# Patient Record
Sex: MALE | URBAN - METROPOLITAN AREA
[De-identification: names, ages, dates, MRNs, and addresses within clinical notes are randomized per-mention and may not be internally consistent; named-entity substitution may affect disease eponyms.]

---

## 2024-10-14 ENCOUNTER — APPOINTMENT (OUTPATIENT)
Dept: URBAN - METROPOLITAN AREA SURGERY 20 | Age: 69
Setting detail: DERMATOLOGY
End: 2024-10-15

## 2024-10-14 PROBLEM — C44.712 BASAL CELL CARCINOMA OF SKIN OF RIGHT LOWER LIMB, INCLUDING HIP: Status: ACTIVE | Noted: 2024-10-14

## 2024-10-14 PROCEDURE — OTHER ADDITIONAL NOTES: OTHER

## 2024-10-14 PROCEDURE — OTHER MIPS QUALITY: OTHER

## 2024-10-14 PROCEDURE — 99214 OFFICE O/P EST MOD 30 MIN: CPT

## 2024-10-14 PROCEDURE — OTHER CONSULTATION FOR SURGERY: OTHER

## 2024-10-14 NOTE — HPI: MOHS SURGERY CONSULTATION
Body Location Override (Optional): Right calf
Who Is Your Referring Provider?: Dr Beltre at [facility]

## 2024-10-14 NOTE — PROCEDURE: CONSULTATION FOR SURGERY
Incorporate Mauc In Note: Yes
Detail Level: Detailed
X Size Of Lesion In Cm (Optional): 0
Aggressive Histology Indication Override: nodular ulcerated
Anatomic Location From Referring Provider: right calf
Other Plan: To be scheduled at a future date

## 2024-12-13 ENCOUNTER — APPOINTMENT (OUTPATIENT)
Dept: URBAN - METROPOLITAN AREA SURGERY 20 | Age: 69
Setting detail: DERMATOLOGY
End: 2024-12-13

## 2024-12-13 VITALS — SYSTOLIC BLOOD PRESSURE: 150 MMHG | TEMPERATURE: 97.6 F | DIASTOLIC BLOOD PRESSURE: 94 MMHG | HEART RATE: 72 BPM

## 2024-12-13 DIAGNOSIS — I87.2 VENOUS INSUFFICIENCY (CHRONIC) (PERIPHERAL): ICD-10-CM

## 2024-12-13 PROBLEM — C44.712 BASAL CELL CARCINOMA OF SKIN OF RIGHT LOWER LIMB, INCLUDING HIP: Status: ACTIVE | Noted: 2024-12-13

## 2024-12-13 PROCEDURE — 29580 STRAPPING UNNA BOOT: CPT | Mod: RT

## 2024-12-13 PROCEDURE — 17313 MOHS 1 STAGE T/A/L: CPT

## 2024-12-13 PROCEDURE — 12032 INTMD RPR S/A/T/EXT 2.6-7.5: CPT | Mod: 59

## 2024-12-13 PROCEDURE — OTHER MIPS QUALITY: OTHER

## 2024-12-13 PROCEDURE — OTHER MOHS SURGERY: OTHER

## 2024-12-13 PROCEDURE — OTHER UNNA BOOT APPLICATION: OTHER

## 2024-12-13 NOTE — PROCEDURE: MOHS SURGERY
Inflammation Suggestive Of Cancer Camouflage Histology Text: There was a dense lymphocytic infiltrate which prevented adequate histologic evaluation of adjacent structures and camouflaged potential tumor present.
Quadrant Reporting?: no
Bcc Macronodular Histology Text: There were large aggregates of basaloid cells.
Scc Well Differentiated Histology Text: There were aggregates of well-differentiated squamous cells.
Mid-Level Procedure Text (C): After obtaining clear surgical margins the patient was sent to a mid-level provider for surgical repair.  The patient understands they will receive post-surgical care and follow-up from the mid-level provider.
Cartilage Graft Text: A scalpel aluminum foil packet is folded, molded and trimmed to serve as an exact template for the ear cartilage strut required for nasal support prior to flap or graft coverage.  The most precisely matching concave surface is chosen, outlined, prepped and draped in the standard sterile fashion. A semicircular incision is made in the ear skin and a flap elevated to expose the underlying cartilage, which is outlined with the template, incised and removed atraumatically and placed in sterile saline.   Hemostasis with electrocoagulation.  The flap is replaced and repaired with simple interrupted, running and basting sutures with sutures.  A pressure dressing is applied.  The cartilage graft is trimmed to precisely fit the defect, with 2-3 mm of additional length on each end.  The borders of the defect are incised to create pockets into which the cartilage graft is inserted to stabilize it.  The graft is secured to the base of the defect with sutures.
No Repair - Repaired With Adjacent Surgical Defect Text (Leave Blank If You Do Not Want): After obtaining clear surgical margins the defect was repaired concurrently with the other surgical defect which was in close approximation.
Vermilion Border Text: The closure involved the vermilion border.
Use Separate Skin Prep For Stages And Repair: Yes
Tarsorrhaphy Text: A tarsorrhaphy was performed using Frost sutures.
Second Skin Substitute Units (Will Override Primary Defect Units If Greater Than 0): 0
Special Stains Stage 5 - Results: Base On Clearance Noted Above
Orbicularis Oris Muscle Flap Text: The defect edges were debeveled with a #15 scalpel blade.  Given that the defect affected the competency of the oral sphincter an orbicularis oris muscle flap was deemed most appropriate to restore this competency and normal muscle function.  Using a sterile surgical marker, an appropriate flap was drawn incorporating the defect. The area thus outlined was incised with a #15 scalpel blade.
Cytokeratin Ae1/Ae3 - Negative Histology Text: CK staining demonstrates a normal density and pattern.
Island Pedicle Flap-Requiring Vessel Identification Text: The defect edges were debeveled with a #15 scalpel blade.  Given the location of the defect, shape of the defect and the proximity to free margins an island pedicle advancement flap was deemed most appropriate.  Using a sterile surgical marker, an appropriate advancement flap was drawn, based on the axial vessel mentioned above, incorporating the defect, outlining the appropriate donor tissue and placing the expected incisions within the relaxed skin tension lines where possible.    The area thus outlined was incised deep to adipose tissue with a #15 scalpel blade.  The skin margins were undermined to an appropriate distance in all directions around the primary defect and laterally outward around the island pedicle utilizing iris scissors.  There was minimal undermining beneath the pedicle flap.
Postop Diagnosis: same
Referring Physician (Optional): KATHY Cristina
Paramedian Forehead Flap Text: A paramedian forehead flap was designed.  An exact template was made of the defect using gentian violet and a non-adherent dressing.  The template was then flattened and traced on the ipsilateral forehead at the hairline with the base of the pedicle at the area of the supratrochlear artery just lateral to the glabellar frown crease.  The pedicle was designed with a base width of 1.0 - 1.5 cm.  The arc of rotation was then checked with a gauze sponge.  The flap was incised to the subcutis distally and then to the galea proximally and elevated to the glabella.  The tissue on the forehead adjacent to the donor site was undermined extensively with blunt scissor dissection in the subgaleal plane.  The flap was then defatted distally using blunt scissor dissection.  The secondary defect was then closed by complex layered closure.  The superficial fascia and subcutaneous layers were closed with buried vertical mattress sutures.  The epidermis was approximated with simple interrupted and running sutures.  Careful attention was given to eversion and even approximation of the wound edges.  The distal flap was thinned of deeper fat lobules preserving a thin layer of fat and the dermal plexus vasculature.  The nasal recipient site was bluntly undermined circumferentially.  The flap was then elevated, transferred, and carried over to the nose and sewn into place in a layered fashion with burried vertical mattress, interrupted and running sutures. The base and pedicle of the flapped were wrapped in Vaseline impregnated gauze.  The pedicle will be divided in 3-4 weeks.
V-Y Flap Text: An island pedicle flap was designed in a V-Y advancement.  The flap was incised to the underlying adipose tissue, and the tissue surrounding the operative site was undermined extensively with blunt scissor dissection.  The flap was also undermined in the subcutaneous fat at each pole to increase mobility leaving the center based on a single pedicle.  The island pedicle flap carried over the primary defect and was secured into place with deep sutures.  The superficial fascia and subcutaneous layers were closed with buried vertical mattress sutures.  The epidermis was then approximated with simple interrupted and running sutures.  Careful attention was given to eversion and even approximation of the edges.
Skin Substitute Text: The defect edges were debeveled with a #15 scalpel blade.  Given the location of the defect, shape of the defect and the proximity to free margins a skin substitute graft was deemed most appropriate.  The graft material was trimmed to fit the size of the defect. The graft was then placed in the primary defect and oriented appropriately.
Otolaryngologist Procedure Text (A): After obtaining clear surgical margins the patient was sent to otolaryngology for surgical repair.  The patient understands they will receive post-surgical care and follow-up from the referring physician's office.
Dressing: pressure dressing with telfa
Scc Pigmented Histology Text: There were aggregates of squamous cells with pigment.
Incidental Squamous Cell Carcinoma In Situ Histology Text: Incidental SCIS was noted at the periphery of the Mohs section.  If clear margins were not obtained, patient was advised to considered 35%TCA followed by topical 5FU once fully healed.
Plastic Surgeon Procedure Text (D): After obtaining clear surgical margins the patient was sent to plastics for surgical repair.  The patient understands they will receive post-surgical care and follow-up from the referring physician's office.
Bcc  Nodulocystic Histology Text: There were aggregates of basaloid cells.
Consent 2/Introductory Paragraph: Mohs surgery was explained to the patient and consent was obtained. The risks, benefits and alternatives to therapy were discussed in detail. Specifically, the risks of infection, scarring, bleeding, prolonged wound healing, incomplete removal, allergy to anesthesia, nerve injury and recurrence were addressed. Prior to the procedure, the treatment site was clearly identified and confirmed by the patient. All components of Universal Protocol/PAUSE Rule completed.
Surgeon Performing Repair (Optional): Roni Orozco M.D.
Perineural Invasion (For Histology - Be Specific If Possible): absent
Mart-1 - Positive Histology Text: MART-1 staining demonstrates areas of higher density and clustering of melanocytes with Pagetoid spread upwards within the epidermis. The surgical margins are positive for tumor cells.
Complex Repair And Graft Additional Text (Will Appearing After The Standard Complex Repair Text): The complex repair was not sufficient to completely close the primary defect. The remaining additional defect was repaired with the graft mentioned below.
Referred To Asc For Closure Text (Leave Blank If You Do Not Want): After obtaining clear surgical margins the patient was sent to an ASC for surgical repair.  The patient understands they will receive post-surgical care and follow-up from the ASC physician.
Mucosal Advancement Flap Text: A mucosal advancement flap is designed to utilize the laxity of the inner lower lip labial mucosa.  The defect is widely undermined in the submucosal plane over the orbicularis muscle along the inner lower lip toward the gingival sulcus.  The defect at the vermilion border and laterally along the lower lip was also undermined.  The mucosal flap is elevated, carried over, and advanced anteriorly over the defect. Cutaneous standing deformities are excised in the standard fashion along the lateral lower lip to taper the deformity and to allow for forward advancement of the flap.  The superficial fascia is approximated with tension bearing buried vertical mattress sutures.  The vermilion border is realigned precisely and reapproximated with simple interrupted and running sutures.  Care is given to even approximation and edge eversion.
Suturegard Body: The suture ends were repeatedly re-tightened and re-clamped to achieve the desired tissue expansion.
Double O-Z Plasty Text: The defect edges were debeveled with a #15 scalpel blade.  Given the location of the defect, shape of the defect and the proximity to free margins a Double O-Z plasty (double transposition flap) was deemed most appropriate.  Using a sterile surgical marker, the appropriate transposition flaps were drawn incorporating the defect and placing the expected incisions within the relaxed skin tension lines where possible. The area thus outlined was incised deep to adipose tissue with a #15 scalpel blade.  The skin margins were undermined to an appropriate distance in all directions utilizing iris scissors.  Hemostasis was achieved with electrocautery.  The flaps were then transposed into place, one clockwise and the other counterclockwise, and anchored with interrupted buried subcutaneous sutures.
Complex Repair Preamble Text (Leave Blank If You Do Not Want): The beveled, asymmetric Mohs defect was debeveled with a scalpel to create vertical tissue edges for proper reapproximation.  The tissue surrounding the operative site was undermined extensively with blunt scissor dissection to reduce tension and prevent tissue distortion.  The orientation of the closure was placed within the relaxed skin tension lines.  The superficial fascia and subcutaneous layers were then closed using buried vertical mattress sutures.  The epidermis was then approximated with simple interrupted and running sutures. Careful attention was given to eversion and even approximation of the edges.  Standing tissue cones were removed as necessary by standard technique.  A pressure dressing was applied.
Closure 4 Information: This tab is for additional flaps and grafts above and beyond our usual structured repairs.  Please note if you enter information here it will not currently bill and you will need to add the billing information manually.
Bi-Rhombic Flap Text: A bi-rhombic transposition flap was designed.  The flap was incised to the underlying adipose tissue.  The tissue surrounding the operative site was undermined extensively with blunt scissor dissection.    The flap was then incised, undermined, elevated, defatted, and carried over the primary defect.  The secondary defect was first closed by complex layered closure, moving the flap into the primary defect.  The superficial fascia and subcutaneous layers were closed with buried vertical mattress sutures.  The epidermis was then approximated with simple interrupted and running sutures.  Careful attention was given to eversion and even approximation of the wound edges.
Bcc Superficial Pigmented Histology Text: There were aggregates of basaloid cells with pigment budding from the epidermis.
Consent (Spinal Accessory)/Introductory Paragraph: The rationale for Mohs was explained to the patient and consent was obtained. The risks, benefits and alternatives to therapy were discussed in detail. Specifically, the risks of damage to the spinal accessory nerve, infection, scarring, bleeding, prolonged wound healing, incomplete removal, allergy to anesthesia, and recurrence were addressed. Prior to the procedure, the treatment site was clearly identified and confirmed by the patient. All components of Universal Protocol/PAUSE Rule completed.
Estimated Blood Loss (Cc): minimal
Estlander Flap (Lower To Upper Lip) Text: The defect of the lower lip was assessed and measured.  Given the location and size of the defect, an Estlander flap was deemed most appropriate.  Using a sterile surgical marker, an appropriate Estlander flap was measured and drawn on the upper lip. Local anesthesia was then infiltrated. A scalpel was then used to incise the lateral aspect of the flap, through skin, muscle and mucosa, leaving the flap pedicled medially.  The flap was then rotated and positioned to fill the lower lip defect.  The flap was then sutured into place with a three layer technique, closing the orbicularis oris muscle layer with subcutaneous buried sutures, followed by a mucosal layer and an epidermal layer.
Retention Suture Text: Retention sutures were placed to support the closure and prevent dehiscence.
Scc Spindle Histology Text: There were aggregates of spindle-like squamous cells.
Missing Epidermis Histology Text: Missing tissue was noted on frozen sections and additional slides were cut until present.  If no additional tissue containing epidermis was available, then an additional stage was excised.
Repair Anesthesia Method: local infiltration
Consent (Near Eyelid Margin)/Introductory Paragraph: The rationale for Mohs was explained to the patient and consent was obtained. The risks, benefits and alternatives to therapy were discussed in detail. Specifically, the risks of ectropion or eyelid deformity, infection, scarring, bleeding, prolonged wound healing, incomplete removal, allergy to anesthesia, nerve injury and recurrence were addressed. Prior to the procedure, the treatment site was clearly identified and confirmed by the patient. All components of Universal Protocol/PAUSE Rule completed.
Stage 15: Additional Anesthesia Type: 1% lidocaine with epinephrine
Eyelid Partial Thickness Repair - 37975: The eyelid defect was partial thickness which required a wedge repair of the eyelid. Special care was taken to ensure that the eyelid margin was realligned when placing sutures.
Oculoplastic Surgeon Procedure Text (C): After obtaining clear surgical margins the patient was sent to oculoplastics for surgical repair.  The patient understands they will receive post-surgical care and follow-up from the referring physician's office.
Mixed Superficial And Nodular Bcc Histology Text: There were aggregates of basaloid cells in a superficial and nodular pattern.
Bilobed Flap Text: A geometric bilobed transposition flap was designed.  The flap was incised to the underlying adipose tissue. The flap was then undermined, elevated, and carried over the primary defect and sutured into place to its pedicle base.  The entire tissue surrounding the operative site was undermined extensively with blunt scissor dissection.  The primary lobe was then defatted using blunt scissor dissection.  The secondary lobe was carried over to cover the secondary defect created by the first lobe of the flap.  The primary lobe of the flap was then carried over the primary defect.  Both lobes of the flap were trimmed to fit the recipient sites as needed.  The superficial fascia and subcutaneous layers were closed with buried vertical mattress sutures.  The epidermis was approximated with simple interrupted and running sutures.  Careful attention was given to eversion and even approximation of the wound edges.
Double Z Plasty Text: To lengthen the scar defect and thereby avoid distortion of the free margin at the pole of the wound, transposition flaps in the form of a double z-plasty were designed in the mid portion of opposing wound margins.  These flaps were then incised, elevated, carried over, and transposed using simple interrupted and 3 point tip sutures, thus increasing the overall vertical length of the incision line.
Double M-Plasty Complex Repair Preamble Text (Leave Blank If You Do Not Want): Extensive wide undermining was performed.
Area H Indication Text: Tumors in this location are included in Area H (eyelids, eyebrows, nose, lips, chin, ear, pre-auricular, post-auricular, temple, genitalia, hands, feet, ankles and areola).  Tissue conservation is critical in these anatomic locations.
Partial Purse String (Simple) Text: Given the location of the defect and the characteristics of the surrounding skin a simple purse string closure was deemed most appropriate.  Undermining was performed circumferentially around the surgical defect.  A purse string suture was then placed and tightened. Wound tension only allowed a partial closure of the circular defect.
Primary Defect Width In Cm (Final Defect Size - Required For Flaps/Grafts): 2.4
Body Location Override (Optional - Billing Will Still Be Based On Selected Body Map Location If Applicable): right calf
Spiral Flap Text: The defect edges were debeveled with a #15 scalpel blade.  Given the location of the defect, shape of the defect and the proximity to free margins a spiral flap was deemed most appropriate.  Using a sterile surgical marker, an appropriate rotation flap was drawn incorporating the defect and placing the expected incisions within the relaxed skin tension lines where possible. The area thus outlined was incised deep to adipose tissue with a #15 scalpel blade.  The skin margins were undermined to an appropriate distance in all directions utilizing iris scissors.
Provider Procedure Text (B): After obtaining clear surgical margins the defect was repaired by another provider.
Burow's Advancement Flap Text: The defect edges were debeveled with a #15 scalpel blade.  Given the location of the defect and the proximity to free margins a Burow's advancement flap was deemed most appropriate.  Using a sterile surgical marker, the appropriate advancement flap was drawn incorporating the defect and placing the expected incisions within the relaxed skin tension lines where possible.    The area thus outlined was incised deep to adipose tissue with a #15 scalpel blade.  The skin margins were undermined to an appropriate distance in all directions utilizing iris scissors and carried over to close the primary defect.
Anesthesia Volume In Cc: 6
Consent (Scalp)/Introductory Paragraph: The rationale for Mohs was explained to the patient and consent was obtained. The risks, benefits and alternatives to therapy were discussed in detail. Specifically, the risks of changes in hair growth pattern secondary to repair, infection, scarring, bleeding, prolonged wound healing, incomplete removal, allergy to anesthesia, nerve injury and recurrence were addressed. Prior to the procedure, the treatment site was clearly identified and confirmed by the patient. All components of Universal Protocol/PAUSE Rule completed.
Bcc Adenoid Histology Text: There were aggregates of basaloid cells demonstrating an adenoid or cribiform pattern.
Ftsg Text: A wound-size template was made using a non-adherent dressing.  The template was then outlined on the donor site with gentian violet.  Local anesthesia was obtained.  The excision at the donor site was carried down to subcutaneous fat.  The harvested graft was placed on a saline gauze.  The donor site was closed by intermediate layered closure. The graft was defatted with blunt scissors.  The graft was then placed on the recipient site and secured at the perimeter.  Careful attention was given to even approximation of the wound edges.  A tie-over bolster dressing with Xeroform gauze and suture was placed over the graft to provide compression.  A pressure dressing was applied to the donor site.
Debridement Text: The wound edges were debrided prior to proceeding with the closure to facilitate wound healing.
Double M-Plasty Intermediate Repair Preamble Text (Leave Blank If You Do Not Want): Undermining was performed with blunt dissection.
Interpolation Flap Text: A decision was made to reconstruct the defect utilizing an interpolation axial flap and a staged reconstruction.  A telfa template was made of the defect.  This telfa template was then used to outline the interpolation flap.  The donor area for the pedicle flap was then injected with anesthesia.  The flap was excised through the skin and subcutaneous tissue down to the layer of the underlying musculature.  The interpolation flap was carefully excised within this deep plane to maintain its blood supply.  The edges of the donor site were undermined.   The donor site was closed in a primary fashion.  The pedicle was then rotated into position and sutured.  Once the tube was sutured into place, adequate blood supply was confirmed with blanching and refill.  The pedicle was then wrapped with xeroform gauze and dressed appropriately with a telfa and gauze bandage to ensure continued blood supply and protect the attached pedicle.
Previous Accession (Optional): [id number]
O-T Advancement Flap Text: An O-T rotation advancement flap was designed.  The flap was incised to the underlying adipose tissue.  The flap was then undermined, elevated, and carried over the primary defect and sutured into place.  The flap was rotated, advanced, elevated, and carried into the primary defect, creating a secondary defect.  Raised tissue cones were removed as necessary by standard technique.  The superficial and subcutaneous layers were closed with buried vertical mattress sutures.  The epidermis was then approximated with simple interrupted and running sutures. Careful attention was given to eversion and even approximation of the wound edges.
Muscle Hinge Flap Text: The defect edges were debeveled with a #15 scalpel blade.  Given the size, depth and location of the defect and the proximity to free margins a muscle hinge flap was deemed most appropriate.  Using a sterile surgical marker, an appropriate hinge flap was drawn incorporating the defect. The area thus outlined was incised with a #15 scalpel blade.  The skin margins were undermined to an appropriate distance in all directions utilizing iris scissors.
Epidermal Closure: running
O-L Flap Text: The defect edges were debeveled with a #15 scalpel blade.  Given the location of the defect, shape of the defect and the proximity to free margins an O-L flap was deemed most appropriate.  Using a sterile surgical marker, an appropriate advancement flap was drawn incorporating the defect and placing the expected incisions within the relaxed skin tension lines where possible.    The area thus outlined was incised deep to adipose tissue with a #15 scalpel blade.  The skin margins were undermined to an appropriate distance in all directions utilizing iris scissors and carried over to close the primary defect.
Mustarde Flap Text: The defect edges were debeveled with a #15 scalpel blade.  Given the size, depth and location of the defect and the proximity to free margins a Mustarde flap was deemed most appropriate.  Using a sterile surgical marker, an appropriate flap was drawn incorporating the defect. The area thus outlined was incised with a #15 scalpel blade.  The skin margins were undermined to an appropriate distance in all directions utilizing iris scissors.
Suturegard Retention Suture: 2-0 Nylon
Area Suspicious For Tumor Histology Text: An area suspicious for additional tumor was noted and excised with additional stage(s).
Bcc Micronodular Histology Text: There were numerous small/micronodular aggregates of palisaded basaloid cells with peripheral clefting
Medical Necessity Statement: Based on my medical judgement, Mohs surgery is the most appropriate treatment for this cancer compared to other treatments.
Composite Graft Text: A defect size template was made with Telfa and placed on the aspect of the ear, where the convexity and curvature correctly matched the recipient bed.  The graft was excised to include the underlying cartilage and immediately placed in cold saline solution.  Hemostasis with electrocoagulation.  The donor defect was closed with buried vertical mattress sutures followed by simple interrupted sutures.  Standing tissue cones were removed by the standard technique.  Subcutaneous fat was trimmed from the graft.  The composite graft was then placed in the defect and secured with basting sutures.  The edges were precisely approximated with simple interrupted sutures, with care to provide edge eversion.
Scc Moderately Differentiated Histology Text: There were aggregates of moderately-differentiated squamous cells.
Unna Boot Text: An Unna boot was placed to help immobilize the limb and facilitate more rapid healing.  Patient was provided instructions on removing the Unna boot at home in 1 week
Anesthesia Volume In Cc: 2
Nostril Rim Text: The closure involved the nostril rim.
Intermediate Repair And Flap Additional Text (Will Appearing After The Standard Complex Repair Text): The intermediate repair was not sufficient to completely close the primary defect. The remaining additional defect was repaired with the flap mentioned below.
Melolabial Transposition Flap Text: A nasolabial transposition flap was designed as a single stage procedure.  The incision lines were designed along the nasolabial fold and medial cheek.  Removal of the superior tissue cone was designed by triangulation with the apex pointed toward the medial canthus.  The flap was incised to the underlying adipose tissue.  The flap was then undermined, elevated, and carried over the primary defect and sutured into place.  The tissue surrounding the operative site was undermined extensively with blunt scissor dissection.  On the nose and medial cheek this was performed at the level of the periosteum. Hemostasis was obtained using electrocoagulation.  The flap was then incised, and defatted using blunt scissor dissection.  The cheek was then carried over and advanced medially utilizing pyriform aperture periosteal tacking sutures, carrying over the flap into the primary defect and to recreate the alar crease concavity.  The secondary defect was then closed by complex layered closure. The primary defect wound bed was trimmed as necessary to create a concave surface at the flap was sutured into place.  The superficial fascia and subcutaneous layers were closed with buried vertical mattress sutures.  The epidermis was approximated with simple interrupted and running sutures.  Careful attention was given to eversion and even approximation of the wound edges.  A pressure dressing was applied.
Length To Time In Minutes Device Was In Place: 10
Keystone Flap Text: The defect edges were debeveled with a #15 scalpel blade.  Given the location of the defect, shape of the defect a keystone flap was deemed most appropriate.  Using a sterile surgical marker, an appropriate keystone flap was drawn incorporating the defect, outlining the appropriate donor tissue and placing the expected incisions within the relaxed skin tension lines where possible. The area thus outlined was incised deep to adipose tissue with a #15 scalpel blade.  The skin margins were undermined to an appropriate distance in all directions around the primary defect and laterally outward around the flap utilizing iris scissors.
Post-Care Instructions: I reviewed with the patient in detail post-care instructions. Patient is not to engage in any heavy lifting, exercise, or swimming for the next 14 days. Should the patient develop any fevers, chills, bleeding, severe pain patient will contact the office immediately.
Brow Lift Text: A midfrontal incision was made medially to the defect to allow access to the tissues just superior to the left eyebrow. Following careful dissection inferiorly in a supraperiosteal plane to the level of the left eyebrow, several 3-0 monocryl sutures were used to resuspend the eyebrow orbicularis oculi muscular unit to the superior frontal bone periosteum. This resulted in an appropriate reapproximation of static eyebrow symmetry and correction of the left brow ptosis.
Advancement-Rotation Flap Text: The defect edges were debeveled with a #15 scalpel blade.  Given the location of the defect, shape of the defect and the proximity to free margins an advancement-rotation flap was deemed most appropriate.  Using a sterile surgical marker, an appropriate flap was drawn incorporating the defect and placing the expected incisions within the relaxed skin tension lines where possible. The area thus outlined was incised deep to adipose tissue with a #15 scalpel blade.  The skin margins were undermined to an appropriate distance in all directions utilizing iris scissors and carried over to close the primary defect.
Consent Type: Consent 1 (Standard)
Epidermal Closure Graft Donor Site (Optional): simple interrupted
Abbe Flap (Upper To Lower Lip) Text: The defect of the lower lip was assessed and measured.  Given the location and size of the defect, an Abbe flap was deemed most appropriate.  Using a sterile surgical marker, an appropriate Abbe flap was measured and drawn on the upper lip. Local anesthesia was then infiltrated.  A scalpel was then used to incise the upper lip through and through the skin, vermilion, muscle and mucosa, leaving the flap pedicled on the opposite side.  The flap was then rotated and transferred to the lower lip defect.  The flap was then sutured into place with a three layer technique, closing the orbicularis oris muscle layer with subcutaneous buried sutures, followed by a mucosal layer and an epidermal layer.
Eyelid Full Thickness Repair - 12547: The eyelid defect was full thickness which required a wedge repair of the eyelid. Special care was taken to ensure that the eyelid margin was realligned when placing sutures.
Information: Selecting Yes will display possible errors in your note based on the variables you have selected. This validation is only offered as a suggestion for you. PLEASE NOTE THAT THE VALIDATION TEXT WILL BE REMOVED WHEN YOU FINALIZE YOUR NOTE. IF YOU WANT TO FAX A PRELIMINARY NOTE YOU WILL NEED TO TOGGLE THIS TO 'NO' IF YOU DO NOT WANT IT IN YOUR FAXED NOTE.
Consent 3/Introductory Paragraph: I gave the patient a chance to ask questions they had about the procedure.  Following this I explained the Mohs procedure and consent was obtained. The risks, benefits and alternatives to therapy were discussed in detail. Specifically, the risks of infection, scarring, bleeding, prolonged wound healing, incomplete removal, allergy to anesthesia, nerve injury and recurrence were addressed. Prior to the procedure, the treatment site was clearly identified and confirmed by the patient. All components of Universal Protocol/PAUSE Rule completed.
Bcc Pigmented Histology Text: There were aggregates of basaloid cells with pigment.
Scc Crateriform Histology Text: There were aggregates of squamous cells.
Intermediate Repair Preamble Text (Leave Blank If You Do Not Want): The tissue surrounding the operative site was undermined with blunt scissor dissection.  The orientation of the closure was placed within the relaxed skin tension lines.  The superficial fascia and subcutaneous layers were first closed with buried vertical mattress sutures to reduce tension on the wound edges.  The epidermis was then approximated with simple interrupted and running sutures.  Careful attention was given to eversion and even approximation of the edges.  Standing tissue cones were removed as necessary by standard technique.
Initial Size Of Lesion: 1.7
Tissue Cultured Epidermal Autograft Text: The defect edges were debeveled with a #15 scalpel blade.  Given the location of the defect, shape of the defect and the proximity to free margins a tissue cultured epidermal autograft was deemed most appropriate.  The graft was then trimmed to fit the size of the defect.  The graft was then placed in the primary defect and oriented appropriately.
Peng Advancement Flap Text: The defect edges were debeveled with a #15 scalpel blade.  Given the location of the defect, shape of the defect and the proximity to free margins a Peng advancement flap was deemed most appropriate.  Using a sterile surgical marker, an appropriate advancement flap was drawn incorporating the defect and placing the expected incisions within the relaxed skin tension lines where possible. The area thus outlined was incised deep to adipose tissue with a #15 scalpel blade.  The skin margins were undermined to an appropriate distance in all directions utilizing iris scissors and carried over to close the primary defect.
Cheiloplasty (Less Than 50%) Text: A decision was made to reconstruct the defect with a  cheiloplasty.  The defect was undermined extensively.  Additional orbicularis oris muscle was excised with a 15 blade scalpel.  The defect was converted into a full thickness wedge, of less than 50% of the vertical height of the lip, to facilite a better cosmetic result.  Small vessels were then tied off with 5-0 monocyrl. The orbicularis oris, superficial fascia, adipose and dermis were then reapproximated.  After the deeper layers were approximated the epidermis was reapproximated with particular care given to realign the vermilion border.
Hemigard Intro: Due to skin fragility and wound tension, it was decided to use HEMIGARD adhesive retention suture devices to permit a linear closure. The skin was cleaned and dried for a 6cm distance away from the wound. Excessive hair, if present, was removed to allow for adhesion.
Helical Rim Advancement Flap Text: A helical advancement flap was designed. The tissue surrounding the operative site was undermined extensively with blunt scissor dissection.  A single incision along the helical sulcus into the earlobe interiorly was made to the underlying adipose tissue.  The flap was then incised and undermined along the cartilage in the subcutaneous fat and elevated.  The flap was then carried over and advanced superiorly into the primary defect. Raised tissue cones were removed as necessary by standard technique on the posterior pinna and anterior earlobe.  The superficial fascia and subcutaneous layers were closed with buried vertical mattress sutures.  The epidermis was then approximated with simple interrupted and running sutures. Careful attention was given to eversion and even approximation of the wound edges.
V-Y Plasty Text: The defect edges were debeveled with a #15 scalpel blade.  Given the location of the defect, shape of the defect and the proximity to free margins an V-Y advancement flap was deemed most appropriate.  Using a sterile surgical marker, an appropriate advancement flap was drawn incorporating the defect and placing the expected incisions within the relaxed skin tension lines where possible.    The area thus outlined was incised deep to adipose tissue with a #15 scalpel blade.  The skin margins were undermined to an appropriate distance in all directions utilizing iris scissors.
Closure 2 Information: This tab is for additional flaps and grafts, including complex repair and grafts and complex repair and flaps. You can also specify a different location for the additional defect, if the location is the same you do not need to select a new one. We will insert the automated text for the repair you select below just as we do for solitary flaps and grafts. Please note that at this time if you select a location with a different insurance zone you will need to override the ICD10 and CPT if appropriate.
Hemigard Postcare Instructions: The HEMIGARD strips are to remain completely dry for at least 5-7 days.
Hatchet Flap Text: The defect edges were debeveled with a #15 scalpel blade.  Given the location of the defect, shape of the defect and the proximity to free margins a hatchet flap was deemed most appropriate.  Using a sterile surgical marker, an appropriate hatchet flap was drawn incorporating the defect and placing the expected incisions within the relaxed skin tension lines where possible.    The area thus outlined was incised deep to adipose tissue with a #15 scalpel blade.  The skin margins were undermined to an appropriate distance in all directions utilizing iris scissors.
Cheiloplasty (Complex) Text: A decision was made to reconstruct the defect with a  cheiloplasty.  The defect was undermined extensively.  Additional orbicularis oris muscle was excised with a 15 blade scalpel.  The defect was converted into a full thickness wedge to facilite a better cosmetic result.  Small vessels were then tied off with 5-0 monocyrl. The orbicularis oris, superficial fascia, adipose and dermis were then reapproximated.  After the deeper layers were approximated the epidermis was reapproximated with particular care given to realign the vermilion border.
Adjacent Tissue Transfer Text: An adjacent tissue transfer flap was designed.  The tissue surrounding the operative site was undermined extensively with blunt scissors dissection.  The flap was incised to the underlying adipose tissue.  The flap was then undermined, elevated, and carried over the primary defect and sutured into place.  Raised tissue cones were removed as necessary by standard technique.  The superficial fascia and subcutaneous layers were closed with buried vertical mattress sutures.  The epidermis was then approximated with simple interrupted and running sutures.  Careful attention was given to eversion and even approximation of the wound edges.
Bilateral Helical Rim Advancement Flap Text: A bilateral helical advancement flap was designed. The tissue surrounding the operative site was undermined extensively with blunt scissor dissection.  A single incision along the helical sulcus into the earlobe interiorly was made to the underlying adipose tissue.  The flap was then incised and undermined along the cartilage in the subcutaneous fat and elevated.  The flap was then carried over and advanced superiorly into the primary defect. Raised tissue cones were removed as necessary by standard technique on the posterior pinna and anterior earlobe.  The superficial fascia and subcutaneous layers were closed with buried vertical mattress sutures.  The epidermis was then approximated with simple interrupted and running sutures. Careful attention was given to eversion and even approximation of the wound edges.
Where Do You Want The Question To Include Opioid Counseling Located?: Case Summary Tab
Scc In Situ Histology Text: There was full thickness epidermal squamous cell atypia and proliferation in a SCCIS pattern with overlying hypogranulosis and parakeratosis.
Mixed Nodular And Infiltrative Bcc Histology Text: There were aggregates of basaloid cells in a nodular and infiltrative pattern.
Consent (Ear)/Introductory Paragraph: The rationale for Mohs was explained to the patient and consent was obtained. The risks, benefits and alternatives to therapy were discussed in detail. Specifically, the risks of ear deformity, infection, scarring, bleeding, prolonged wound healing, incomplete removal, allergy to anesthesia, nerve injury and recurrence were addressed. Prior to the procedure, the treatment site was clearly identified and confirmed by the patient. All components of Universal Protocol/PAUSE Rule completed.
Dressing (No Sutures): steri-strips and pressure dressing
Is There Documentation In The Chart Showing Discussion Of Changes With Another Physician?: Please Select the Appropriate Response
Area M Indication Text: Tumors in this location are included in Area M (cheek, forehead, scalp, neck, jawline and pretibial skin).  Mohs surgery is indicated for tumors in these anatomic locations.
Zygomaticofacial Flap Text: Given the location of the defect, shape of the defect and the proximity to free margins a zygomaticofacial flap was deemed most appropriate for repair.  Using a sterile surgical marker, the appropriate flap was drawn incorporating the defect and placing the expected incisions within the relaxed skin tension lines where possible. The area thus outlined was incised deep to adipose tissue with a #15 scalpel blade with preservation of a vascular pedicle.  The skin margins were undermined to an appropriate distance in all directions utilizing iris scissors.  The flap was then placed into the defect and anchored with interrupted buried subcutaneous sutures.
Tumor Depth: Less than 6mm from granular layer and no invasion beyond the subcutaneous fat
Surgical Defect Length In Cm (Optional): 2.7
Staged Advancement Flap Text: The defect edges were debeveled with a #15 scalpel blade.  Given the location of the defect, shape of the defect and the proximity to free margins a staged advancement flap was deemed most appropriate.  Using a sterile surgical marker, an appropriate advancement flap was drawn incorporating the defect and placing the expected incisions within the relaxed skin tension lines where possible. The area thus outlined was incised deep to adipose tissue with a #15 scalpel blade.  The skin margins were undermined to an appropriate distance in all directions utilizing iris scissors and carried over to close the primary defect.  That flap was incised, undermined, elevated, and carried over into the primary defect.
Chonodrocutaneous Helical Advancement Flap Text: A helical advancement flap was designed. The tissue surrounding the operative site was undermined extensively with blunt scissor dissection.  A single incision along the helical sulcus into the earlobe interiorly was made to the underlying adipose tissue.  The flap was then undermined along the cartilage in the subcutaneous fat and elevated.  The flap was then carried over and advanced superiorly into the primary defect. Raised tissue cones were removed as necessary by standard technique on the posterior pinna and anterior earlobe.  The superficial fascia and subcutaneous layers were closed with buried vertical mattress sutures.  The epidermis was then approximated with simple interrupted and running sutures. Careful attention was given to eversion and even approximation of the wound edges.
Detail Level: Detailed
Wound Check: 14 days
Repair Hemostasis (Optional): Electrocautery
Split-Thickness Skin Graft Text: The defect edges were debeveled with a #15 scalpel blade.  Given the location of the defect, shape of the defect and the proximity to free margins a split thickness skin graft was deemed most appropriate.  Using a sterile surgical marker, the primary defect shape was transferred to the donor site. The split thickness graft was then harvested.  The skin graft was then placed in the primary defect and oriented appropriately.
Which Eyelid Repair Cpt Are You Using?: 01839
Bcc Infiltrative Histology Text: There were numerous aggregates of basaloid cells demonstrating an infiltrative pattern.
Mohs Method Verbiage: An incision at a 45 degree angle following the standard Mohs approach was done and the specimen was harvested as a microscopic controlled layer.
Island Pedicle Flap With Canthal Suspension Text: The defect edges were debeveled with a #15 scalpel blade.  Given the location of the defect, shape of the defect and the proximity to free margins an island pedicle advancement flap was deemed most appropriate.  Using a sterile surgical marker, an appropriate advancement flap was drawn incorporating the defect, outlining the appropriate donor tissue and placing the expected incisions within the relaxed skin tension lines where possible. The area thus outlined was incised deep to adipose tissue with a #15 scalpel blade.  The skin margins were undermined to an appropriate distance in all directions around the primary defect and laterally outward around the island pedicle utilizing iris scissors.  There was minimal undermining beneath the pedicle flap. A suspension suture was placed in the canthal tendon to prevent tension and prevent ectropion.
Non-Graft Cartilage Fenestration Text: The area was anesthetized to include the underlying cartilage and soft tissue on the opposite surface.  3 mm punch fenestrations were performed through the cartilage to expose the underlying perichondrium and subcutaneous tissue.  The fenestrations improve wound healing by expediting granulation tissue formation and re-epithelization in the bare cartilage defect base.
Melolabial Interpolation Flap Text: An interpolation flap was designed on the adjacent paranasal cheek skin in a triangular island configuration.  The flap was then incised and elevated, and undermined to the pedicle base utilizing perforating arterioles for vascular supply.  The edges were bluntly undermined at the periphery of the donor and recipient beds.  Adipose tissue was trimmed from the distal flap.  The flap was then carried over and sutured into the wound using key buried vertical mattress sutures and multiple Prolene simple interrupted sutures.  The donor defect was closed with multiple buried sutures, and interrupted and running sutures.  Xeroform dressing was placed behind the flap's pedicle.
Additional Debulk Details (Optional): Tumor
Nasal Turnover Hinge Flap Text: The defect edges were debeveled with a #15 scalpel blade.  Given the size, depth, location of the defect and the defect being full thickness a nasal turnover hinge flap was deemed most appropriate.  Using a sterile surgical marker, an appropriate hinge flap was drawn incorporating the defect. The area thus outlined was incised with a #15 scalpel blade. The flap was designed to recreate the nasal mucosal lining and the alar rim. The skin margins were undermined to an appropriate distance in all directions utilizing iris scissors.
Mastoid Interpolation Flap Text: An area of non-hair-bearing postauricular skin was delineated, with width corresponding the length of the auricular wound.  The flap was then incised and undermined at the level of the underlying fascia.  The ear was pinned back with simple sutures above and/or below the defect to reduce tension on the flap and improve flap reach.  The distal flap was then elevated, carried over the primary defect, and sutured into the ear wound with multiple buried vertical mattress sutures to provide eversion along the helical rim and multiple simple and running epidermal sutures.  A Xeroform gauze dressing was placed behind the flap pedicle.
Undermining Type: Entire Wound
Surgeon/Pathologist Verbiage (Will Incorporate Name Of Surgeon From Intro If Not Blank): operated in two distinct and integrated capacities as the surgeon and pathologist.
Graft Cartilage Fenestration Text: The area was anesthetized to include the underlying cartilage and soft tissue on the opposite surface.  3 mm punch fenestrations were performed through the cartilage to expose the underlying perichondrium and subcutaneous tissue.  The fenestrations improve wound healing by expediting granulation tissue formation and providing a vascular bed for graft nourishment
Alar Island Pedicle Flap Text: The defect edges were debeveled with a #15 scalpel blade.  Given the location of the defect, shape of the defect and the proximity to the alar rim an island pedicle advancement flap was deemed most appropriate.  Using a sterile surgical marker, an appropriate advancement flap was drawn incorporating the defect, outlining the appropriate donor tissue and placing the expected incisions within the nasal ala running parallel to the alar rim. The area thus outlined was incised with a #15 scalpel blade.  The skin margins were undermined minimally to an appropriate distance in all directions around the primary defect and laterally outward around the island pedicle utilizing iris scissors.  There was minimal undermining beneath the pedicle flap.
O-Z Flap Text: An O-Z rotation flap was designed.  The flap was incised to the underlying adipose tissue.  The flap was then undermined, elevated, and carried over the primary defect and sutured into place and elevated, rotated, and carried over into the primary defect, creating a secondary defect.  Raised tissue cones were removed as necessary by standard technique. The superficial fascia and subcutaneous layers were closed with buried vertical mattress sutures. The epidermis was then approximated with simple interrupted and running sutures.  Careful attention was given to eversion and even approximation of the wound edges.
Home Suture Removal Text: Patient was provided instructions on removing sutures and will remove their sutures at home.  If they have any questions or difficulties they will call the office.
Alternatives Discussed Intro (Do Not Add Period): I discussed alternative treatments to Mohs surgery and specifically discussed the risks and benefits of
Intermediate Repair And Graft Additional Text (Will Appearing After The Standard Complex Repair Text): The intermediate repair was not sufficient to completely close the primary defect. The remaining additional defect was repaired with the graft mentioned below.
Epidermal Autograft Text: The defect edges were debeveled with a #15 scalpel blade.  Given the location of the defect, shape of the defect and the proximity to free margins an epidermal autograft was deemed most appropriate.  Using a sterile surgical marker, the primary defect shape was transferred to the donor site. The epidermal graft was then harvested.  The skin graft was then placed in the primary defect and oriented appropriately.
Follicular Atypia Histology Text: Follicular atypia was noted and reviewed with patient, patient was advised to monitor and report any skin changes.
Scc Poorly Differentiated Histology Text: There were aggregates of poorly-differentiated squamous cells.
Bcc  Morpheaform/Sclerosing Histology Text: There were numerous aggregates of basaloid cells demonstrating an infiltrative pattern in a background of sclerotic stroma
Wound Care: Petrolatum
O-T Plasty Text: The defect edges were debeveled with a #15 scalpel blade.  Given the location of the defect, shape of the defect and the proximity to free margins an O-T plasty was deemed most appropriate.  Using a sterile surgical marker, an appropriate O-T plasty was drawn incorporating the defect and placing the expected incisions within the relaxed skin tension lines where possible.    The area thus outlined was incised deep to adipose tissue with a #15 scalpel blade.  The skin margins were undermined to an appropriate distance in all directions utilizing iris scissors.
Depth Of Tumor Invasion (For Histology): tumor not visualized
Mercedes Flap Text: The tissue surrounding the operative site was undermined extensively with blunt scissor dissection.  A mercedes advancement flap was designed.  The flaps were incised to the underlying adipose tissue.  The flaps were then undermined, elevated, and carried over the primary defect and sutured into place.  The flaps were then advanced into the primary defect.  Raised tissue cones were removed as necessary by standard technique.  The superficial fascia and subcutaneous layers were closed with buried vertical mattress sutures.  The epidermis was then approximated with simple interrupted and running sutures.  Careful attention was given to eversion and even approximation of the wound edges.
Rhombic Flap Text: A rhombic transposition flap was designed.  The flap was incised to the underlying adipose tissue.  The tissue surrounding the operative site was undermined extensively with blunt scissor dissection.    The flap was then incised, undermined, elevated, defatted, and carried over the primary defect.  The secondary defect was first closed by complex layered closure, moving the flap into the primary defect.  The superficial fascia and subcutaneous layers were closed with buried vertical mattress sutures.  The epidermis was then approximated with simple interrupted and running sutures.  Careful attention was given to eversion and even approximation of the wound edges.
Subsequent Stages Histo Method Verbiage: Using a similar technique to that described above, a thin layer of tissue was removed from all areas where tumor was visible on the previous stage.  The tissue was again oriented, mapped, dyed, and processed as above.
Deep Sutures: 4-0 Vicryl
Scc Ka Subtype Histology Text: There were aggregates of glassy squamous cells consistent with keratoacanthoma.
Abbe Flap (Lower To Upper Lip) Text: The defect of the upper lip was assessed and measured.  Given the location and size of the defect, an Abbe flap was deemed most appropriate.  Using a sterile surgical marker, an appropriate Abbe flap was measured and drawn on the lower lip. Local anesthesia was then infiltrated. A scalpel was then used to incise the upper lip through and through the skin, vermilion, muscle and mucosa, leaving the flap pedicled on the opposite side.  The flap was then rotated and transferred to the lower lip defect.  The flap was then sutured into place with a three layer technique, closing the orbicularis oris muscle layer with subcutaneous buried sutures, followed by a mucosal layer and an epidermal layer.
Consent (Temporal Branch)/Introductory Paragraph: The rationale for Mohs was explained to the patient and consent was obtained. The risks, benefits and alternatives to therapy were discussed in detail. Specifically, the risks of damage to the temporal branch of the facial nerve, infection, scarring, bleeding, prolonged wound healing, incomplete removal, allergy to anesthesia, and recurrence were addressed. Prior to the procedure, the treatment site was clearly identified and confirmed by the patient. All components of Universal Protocol/PAUSE Rule completed.
Xenograft Text: The defect edges were debeveled with a #15 scalpel blade.  Given the location of the defect, shape of the defect and the proximity to free margins a xenograft was deemed most appropriate.  The graft was then trimmed to fit the size of the defect.  The graft was then placed in the primary defect and oriented appropriately.
X Size Of Lesion In Cm (Optional): 1.6
H Plasty Text: Given the location of the defect, shape of the defect and the proximity to free margins a H-plasty was deemed most appropriate for repair.  Using a sterile surgical marker, the appropriate advancement arms of the H-plasty were drawn incorporating the defect and placing the expected incisions within the relaxed skin tension lines where possible. The area thus outlined was incised deep to adipose tissue with a #15 scalpel blade. The skin margins were undermined to an appropriate distance in all directions utilizing iris scissors.  The opposing advancement arms were then advanced into place in opposite direction and anchored with interrupted buried subcutaneous sutures.
Staging Info: By selecting yes to the question above you will include information on AJCC 8 tumor staging in your Mohs note. Information on tumor staging will be automatically added for SCCs on the head and neck. AJCC 8 includes tumor size, tumor depth, perineural involvement and bone invasion.
Number Of Stages: 1
Purse String (Simple) Text: Given the location of the defect and the characteristics of the surrounding skin a purse string closure was deemed most appropriate.  Undermining was performed circumferentially around the surgical defect.  A purse string suture was then placed and tightened.
Donor Site Anesthesia Type: same as repair anesthesia
Advancement Flap (Single) Text: An advancement flap was designed.  The tissue surrounding the operative site was undermined extensively with blunt scissors dissection.  The flap was incised to the underlying adipose tissue.  The flap was then undermined, elevated, and carried over the primary defect and sutured into place.  Raised tissue cones were removed as necessary by standard technique.  The superficial fascia and subcutaneous layers were closed with buried vertical mattress sutures.  The epidermis was then approximated with simple interrupted and running sutures.  Careful attention was given to eversion and even approximation of the wound edges.
Rotation Flap Text: A rotation flap was designed.  The flap was incised to the underlying adipose tissue.  The tissue surrounding the operative site was undermined extensively with blunt scissor dissection.  The flap was then undermined, elevated, rotated and carried over the primary defect and sutured into place.  Hemostasis was obtained using electrocoagulation.  Raised tissue cones were removed as necessary by standard technique.  The superficial fascia and subcutaneous layers were closed with buried vertical mattress sutures.  The epidermis was then approximated with simple interrupted and running sutures.  Careful attention was given to eversion and even approximation of the wound edges.
Ear Star Wedge Flap Text: The defect edges were debeveled with a #15 blade scalpel.  Given the location of the defect and the proximity to free margins (helical rim) an ear star wedge flap was deemed most appropriate.  Using a sterile surgical marker, the appropriate flap was drawn incorporating the defect and placing the expected incisions between the helical rim and antihelix where possible.  The area thus outlined was incised through and through with a #15 scalpel blade.
W Plasty Text: The lesion was extirpated to the level of the fat with a #15 scalpel blade.  Given the location of the defect, shape of the defect and the proximity to free margins a W-plasty was deemed most appropriate for repair.  Using a sterile surgical marker, the appropriate transposition arms of the W-plasty were drawn incorporating the defect and placing the expected incisions within the relaxed skin tension lines where possible.    The area thus outlined was incised deep to adipose tissue with a #15 scalpel blade.  The skin margins were undermined to an appropriate distance in all directions utilizing iris scissors.  The opposing transposition arms were then transposed into place in opposite direction and anchored with interrupted buried subcutaneous sutures.
Consent (Nose)/Introductory Paragraph: The rationale for Mohs was explained to the patient and consent was obtained. The risks, benefits and alternatives to therapy were discussed in detail. Specifically, the risks of nasal deformity, changes in the flow of air through the nose, infection, scarring, bleeding, prolonged wound healing, incomplete removal, allergy to anesthesia, nerve injury and recurrence were addressed. Prior to the procedure, the treatment site was clearly identified and confirmed by the patient. All components of Universal Protocol/PAUSE Rule completed.
Anesthesia Type: 1% lidocaine with 1:100,000 epinephrine and 408mcg clindamycin/ml and a 1:10 solution of 8.4% sodium bicarbonate
Scc In Situ With Follicular Extension Histology Text: There was full thickness epidermal squamous cell atypia and proliferation in a SCCIS pattern with overlying hypogranulosis, parakeratosis, and follicular or adnexal extension.
Ear Wedge Repair Text: A full helical wedge was designed.  The excision removing the full helical wedge was performed using a #15 blade.  The wound was closed in separate sequential layers (cartilage, skin). The sutures were placed from the apex upward with buried sutures and simple interrupted and running epidermal sutures.  The helical rim was carefully reapproximated with sutures to provide eversion with buried vertical mattress and epidermal mattress interrupted and running sutures for eversion and even approximation.
Mixed Nodular And Micronodular Bcc Histology Text: There were aggregates of basaloid cells in a nodular and micro nodular pattern.
Manual Repair Warning Statement: We plan on removing the manually selected variable below in favor of our much easier automatic structured text blocks found in the previous tab. We decided to do this to help make the flow better and give you the full power of structured data. Manual selection is never going to be ideal in our platform and I would encourage you to avoid using manual selection from this point on, especially since I will be sunsetting this feature. It is important that you do one of two things with the customized text below. First, you can save all of the text in a word file so you can have it for future reference. Second, transfer the text to the appropriate area in the Library tab. Lastly, if there is a flap or graft type which we do not have you need to let us know right away so I can add it in before the variable is hidden. No need to panic, we plan to give you roughly 6 months to make the change.
Trilobed Flap Text: A geometric trilobed transposition flap was designed utilizing 45 degree transposition of each flap lobe to direct tension vectors horizontally along the nasal sidewall to prevent distortion of the alar rim.  The flap was incised to the underlying adipose tissue. The flap was then undermined, elevated, and carried over the primary defect and sutured into place to its pedicle base.  The entire tissue surrounding the operative site was undermined extensively with blunt scissor dissection.  The elliptical tertiary donor area was first closed.  The tertiary lobe was transposed, and carried over to cover the tertiary defect created by the second lobe.  The secondary lobe was transposed, and carried over to cover the secondary defect created by the first lobe of the flap.  The primary lobe of the flap was then transposed, and carried over over the primary defect.  All lobes of the flap were trimmed to fit the recipient sites as needed.  The superficial fascia and subcutaneous layers were closed with buried vertical mattress sutures.  The epidermis was approximated with simple interrupted and running sutures.  Careful attention was given to eversion and even approximation of the wound edges.
Simple / Intermediate / Complex Repair - Final Wound Length In Cm: 5.8
Area L Indication Text: Tumors in this location are included in Area L (trunk and extremities).  Mohs surgery is indicated for larger tumors, or tumors with aggressive histologic features, in these anatomic locations.
Pain Refusal Text: I offered to prescribe pain medication but the patient refused to take this medication.
Mohs Case Number: [id number]
Crescentic Advancement Flap Text: The defect edges were debeveled with a #15 scalpel blade.  Given the location of the defect and the proximity to free margins a crescentic advancement flap was deemed most appropriate.  Using a sterile surgical marker, the appropriate advancement flap was drawn incorporating the defect and placing the expected incisions within the relaxed skin tension lines where possible.    The area thus outlined was incised deep to adipose tissue with a #15 scalpel blade.  The skin margins were undermined to an appropriate distance in all directions utilizing iris scissors and carried over to close the primary defect.
Star Wedge Flap Text: The defect edges were debeveled with a #15 scalpel blade.  Given the location of the defect, shape of the defect and the proximity to free margins a star wedge flap was deemed most appropriate.  Using a sterile surgical marker, an appropriate rotation flap was drawn incorporating the defect and placing the expected incisions within the relaxed skin tension lines where possible. The area thus outlined was incised deep to adipose tissue with a #15 scalpel blade.  The skin margins were undermined to an appropriate distance in all directions utilizing iris scissors.
Same Histology In Subsequent Stages Text: The pattern and morphology of the tumor is as described in the first stage.
Bcc Infundibulocystic Histology Text: There were aggregates of basaloid cells demonstrating an infundibulocystic pattern.
Pinch Graft Text: The defect edges were debeveled with a #15 scalpel blade. Given the location of the defect, shape of the defect and the proximity to free margins a pinch graft was deemed most appropriate. Using a sterile surgical marker, the primary defect shape was transferred to the donor site. The area thus outlined was incised deep to adipose tissue with a #15 scalpel blade.  The harvested graft was then trimmed of adipose tissue until only dermis and epidermis was left. The skin margins of the secondary defect were undermined to an appropriate distance in all directions utilizing iris scissors.  The secondary defect was closed with interrupted buried subcutaneous sutures.  The skin edges were then re-apposed with running  sutures.  The skin graft was then placed in the primary defect and oriented appropriately.
Partial Purse String (Intermediate) Text: Given the location of the defect and the characteristics of the surrounding skin an intermediate purse string closure was deemed most appropriate.  Undermining was performed circumferentially around the surgical defect.  A purse string suture was then placed and tightened. Wound tension only allowed a partial closure of the circular defect.
Helical Rim Text: The closure involved the helical rim.
Secondary Intention Text (Leave Blank If You Do Not Want): The defect will heal with secondary intention.
Localized Dermabrasion With Wire Brush Text: The patient was draped in routine manner.  Localized dermabrasion using 220 grit sterile sandpaper was performed in routine manner to papillary dermis. This spot dermabrasion is being performed to complete skin cancer reconstruction. It also will eliminate the other sun damaged precancerous cells that are known to be part of the regional effect of a lifetime's worth of sun exposure. This localized dermabrasion is therapeutic and should not be considered cosmetic in any regard.
Retention Suture Bite Size: 3 mm
Mohs Histo Method Verbiage: Each section was then chromacoded and processed in the Mohs lab using the Mohs protocol and submitted for frozen section.
Nasalis-Muscle-Based Myocutaneous Island Pedicle Flap Text: Using a #15 blade, an incision was made around the donor flap to the level of the nasalis muscle. Wide lateral undermining was then performed in both the subcutaneous plane above the nasalis muscle, and in a submuscular plane just above periosteum. This allowed the formation of a free nasalis muscle axial pedicle (based on the angular artery) which was still attached to the actual cutaneous flap, increasing its mobility and vascular viability. Hemostasis was obtained with pinpoint electrocoagulation. The flap was mobilized into position and the pivotal anchor points positioned and stabilized with buried interrupted sutures. Subcutaneous and dermal tissues were closed in a multilayered fashion with sutures. Tissue redundancies were excised, and the epidermal edges were apposed without significant tension and sutured with sutures.
Double Island Pedicle Flap Text: The defect edges were debeveled with a #15 scalpel blade.  Given the location of the defect, shape of the defect and the proximity to free margins a double island pedicle advancement flap was deemed most appropriate.  Using a sterile surgical marker, an appropriate advancement flap was drawn incorporating the defect, outlining the appropriate donor tissue and placing the expected incisions within the relaxed skin tension lines where possible.    The area thus outlined was incised deep to adipose tissue with a #15 scalpel blade.  The skin margins were undermined to an appropriate distance in all directions around the primary defect and laterally outward around the island pedicle utilizing iris scissors.  There was minimal undermining beneath the pedicle flap.
Eye Protection Verbiage: Before proceeding with the stage, a plastic scleral shield was inserted. The globe was anesthetized with a few drops of 1% lidocaine with 1:100,000 epinephrine. Then, an appropriate sized scleral shield was chosen and coated with lacrilube ointment. The shield was gently inserted and left in place for the duration of each stage. After the stage was completed, the shield was gently removed.
Double O-Z Flap Text: An double O-Z rotation flap was designed.  The flap was incised to the underlying adipose tissue.  The flap was then undermined, elevated, and carried over the primary defect and sutured into place and elevated, rotated, and carried over into the primary defect, creating a secondary defect.  Raised tissue cones were removed as necessary by standard technique. The superficial fascia and subcutaneous layers were closed with buried vertical mattress sutures. The epidermis was then approximated with simple interrupted and running sutures.  Careful attention was given to eversion and even approximation of the wound edges.
Dermal Autograft Text: The defect edges were debeveled with a #15 scalpel blade.  Given the location of the defect, shape of the defect and the proximity to free margins a dermal autograft was deemed most appropriate.  Using a sterile surgical marker, the primary defect shape was transferred to the donor site. The area thus outlined was incised deep to adipose tissue with a #15 scalpel blade.  The harvested graft was then trimmed of adipose and epidermal tissue until only dermis was left.  The skin graft was then placed in the primary defect and oriented appropriately.
Bcc  Nodular Histology Text: There were numerous large/nodular aggregates of palisaded basaloid cells with peripheral clefting
Scc Acantholytic Histology Text: There were aggregates of squamous cells in an acantholytic pattern.
Incidental Superficial Basal Cell Carcinoma Histology Text: Incidental superficial BCC was noted at the periphery of the Mohs section.  If clear margins were not obtained, patient was advised to considered 35%TCA followed by topical 5FU once fully healed.
Consent 1/Introductory Paragraph: The rationale for Mohs was explained to the patient and consent was obtained. The risks, benefits and alternatives to therapy were discussed in detail. Specifically, the risks of infection, scarring, bleeding, prolonged wound healing, incomplete removal, allergy to anesthesia, nerve injury and recurrence were addressed. Prior to the procedure, the treatment site was clearly identified and confirmed by the patient. All components of Universal Protocol/PAUSE Rule completed.
Mart-1 - Negative Histology Text: MART-1 staining demonstrates a normal density and pattern of melanocytes along the dermal-epidermal junction. The surgical margins are negative for tumor cells.
Mohs Rapid Report Verbiage: The area of clinically evident tumor was marked with skin marking ink and appropriately hatched.  The initial incision was made following the Mohs approach through the skin.  The specimen was taken to the lab, divided into the necessary number of pieces, chromacoded and processed according to the Mohs protocol.  This was repeated in successive stages until a tumor free defect was achieved.
O-Z Plasty Text: The defect edges were debeveled with a #15 scalpel blade.  Given the location of the defect, shape of the defect and the proximity to free margins an O-Z plasty (double transposition flap) was deemed most appropriate.  Using a sterile surgical marker, the appropriate transposition flaps were drawn incorporating the defect and placing the expected incisions within the relaxed skin tension lines where possible.    The area thus outlined was incised deep to adipose tissue with a #15 scalpel blade.  The skin margins were undermined to an appropriate distance in all directions utilizing iris scissors.  Hemostasis was achieved with electrocautery.  The flaps were then transposed into place, one clockwise and the other counterclockwise, and anchored with interrupted buried subcutaneous sutures.
Complex Repair And Flap Additional Text (Will Appearing After The Standard Complex Repair Text): The complex repair was not sufficient to completely close the primary defect. The remaining additional defect was repaired with the flap mentioned below.
Suturegard Intro: Intraoperative tissue expansion was performed, utilizing the SUTUREGARD device, in order to reduce wound tension.
Graft Donor Site Bandage (Optional-Leave Blank If You Don't Want In Note): Pressure bandage were applied to the donor site.
Modified Advancement Flap Text: The defect edges were debeveled with a #15 scalpel blade.  Given the location of the defect, shape of the defect and the proximity to free margins a modified advancement flap was deemed most appropriate.  Using a sterile surgical marker, an appropriate advancement flap was drawn incorporating the defect and placing the expected incisions within the relaxed skin tension lines where possible.    The area thus outlined was incised deep to adipose tissue with a #15 scalpel blade.  The skin margins were undermined to an appropriate distance in all directions utilizing iris scissors and carried over to close the primary defect.
Consent (Marginal Mandibular)/Introductory Paragraph: The rationale for Mohs was explained to the patient and consent was obtained. The risks, benefits and alternatives to therapy were discussed in detail. Specifically, the risks of damage to the marginal mandibular branch of the facial nerve, infection, scarring, bleeding, prolonged wound healing, incomplete removal, allergy to anesthesia, and recurrence were addressed. Prior to the procedure, the treatment site was clearly identified and confirmed by the patient. All components of Universal Protocol/PAUSE Rule completed.
Scc Desmoplastic Subtype Histology Text: There were aggregates of squamous cells in a desmoplastic stroma.
Incidental Actinic Keratosis Histology Text: Incidental AK was noted at the periphery of the Mohs section destruction was performed, pt was was advised to monitor, and/or patient was advised to considered topical 5FU once fully healed.
Bcc Superficial Histology Text: There were aggregates of basaloid cells budding from the epidermis.
Scc Sarcomatoid Histology Text: There were aggregates of squamous cells in a sarcomatous pattern.
Banner Transposition Flap Text: A transposition flap with a banner configuration was delineated in the adjacent donor area.  The flap was incised, widely undermined, elevated, and carried over the primary defect, then sutured into place.  Key buried subcutaneous sutures were placed to close the donor area.  The flap was then secured in place with buried vertical mattress sutures and the epidermis was approximated with simple interrupted and running sutures.  The donor area was similarly closed.  All tissue redundancies were repaired in standard fashion.
Z Plasty Text: To lengthen the scar defect and thereby avoid distortion of the free margin at the pole of the wound, transposition flaps in the form of a z-plasty were designed in the mid portion of opposing wound margins.  These flaps were then incised, elevated, carried over, and transposed using simple interrupted and 3 point tip sutures, thus increasing the overall vertical length of the incision line.
Advancement Flap (Double) Text: The tissue surrounding the operative site was undermined extensively with blunt scissor dissection.  A bilateral advancement flap was designed.  The flaps were incised to the underlying adipose tissue.  The flaps were then undermined, elevated, and carried over the primary defect and sutured into place.  The flaps were then advanced into the primary defect.  Raised tissue cones were removed as necessary by standard technique.  The superficial fascia and subcutaneous layers were closed with buried vertical mattress sutures.  The epidermis was then approximated with simple interrupted and running sutures.  Careful attention was given to eversion and even approximation of the wound edges.
Purse String (Intermediate) Text: Given the location of the defect and the characteristics of the surrounding skin a purse string intermediate closure was deemed most appropriate.  Undermining was performed circumferentially around the surgical defect.  A purse string suture was then placed and tightened.
M-Plasty Complex Repair Preamble Text (Leave Blank If You Do Not Want): The beveled, asymmetric Mohs defect was debeveled with a scalpel to create vertical tissue edges for proper reapproximation.  The tissue surrounding the operative site was undermined extensively with blunt scissor dissection.  The orientation of the closure was placed within the relaxed skin tension lines.  An M-Plasty was designed to reduce the length of the surgical wound to prevent anatomic distortion.  The superficial fascia and subcutaneous layers were first closed using buried vertical mattress sutures.  The epidermis was then approximated with simple interrupted and running sutures. A three-point tip stitch was utilized at the M-Plasty site.  Careful attention was given to eversion and even approximation of the edges.  Standing tissue cones were removed as necessary by standard technique.  A pressure dressing was applied.
Epidermal Sutures: 3-0 Prolene
Bilateral Rotation Flap Text: A bilateral rotation flap was designed.  The flap was incised to the underlying adipose tissue.  The tissue surrounding the operative site was undermined extensively with blunt scissor dissection.  The flap was then undermined, elevated, rotated and carried over the primary defect and sutured into place.  Hemostasis was obtained using electrocoagulation.  Raised tissue cones were removed as necessary by standard technique.  The superficial fascia and subcutaneous layers were closed with buried vertical mattress sutures.  The epidermis was then approximated with simple interrupted and running sutures.  Careful attention was given to eversion and even approximation of the wound edges.
Full Thickness Lip Wedge Repair (Flap) Text: The vermilion border of the lip adjacent to the wound was carefully marked with gentian violet.  A full lip wedge was designed. The excision removing the full lip wedge was performed using a #15 blade.  The labial arteries were isolated and indirectly electrocoagulated.  The wound was closed in separate sequential layers (labial mucosa, orbicularis oris muscle and skin) using buried sutures and mucosal and epidermal simple interrupted, running and locked running sutures.  The sutures were placed from the apex upward. The vermilion border was carefully reapproximated.
Consent (Lip)/Introductory Paragraph: The rationale for Mohs was explained to the patient and consent was obtained. The risks, benefits and alternatives to therapy were discussed in detail. Specifically, the risks of lip deformity, changes in the oral aperture, infection, scarring, bleeding, prolonged wound healing, incomplete removal, allergy to anesthesia, nerve injury and recurrence were addressed. Prior to the procedure, the treatment site was clearly identified and confirmed by the patient. All components of Universal Protocol/PAUSE Rule completed.
Tumor Debulked?: double edged blade
Dorsal Nasal Flap Text: The defect edges were debeveled with a #15 scalpel blade.  Given the location of the defect and the proximity to free margins a dorsal nasal flap was deemed most appropriate.  Using a sterile surgical marker, an appropriate dorsal nasal flap was drawn around the defect.    The area thus outlined was incised deep to adipose tissue with a #15 scalpel blade.  The skin margins were undermined to an appropriate distance in all directions utilizing iris scissors.
M-Plasty Intermediate Repair Preamble Text (Leave Blank If You Do Not Want): The beveled, asymmetric Mohs defect was debeveled with a scalpel to create vertical tissue edges for proper reapproximation.  The tissue surrounding the operative site was undermined with blunt scissor dissection.  The orientation of the closure was placed within the relaxed skin tension lines.  An M-Plasty was designed to reduce the length of the surgical wound to prevent anatomic distortion.  The superficial fascia and subcutaneous layers were first closed using buried vertical mattress sutures.  The epidermis was then approximated with simple interrupted and running sutures. A three-point tip stitch was utilized at the M-Plasty site.  Careful attention was given to eversion and even approximation of the edges.  Standing tissue cones were removed as necessary by standard technique.  A pressure dressing was applied.
Repair Type: Intermediate Layered Repair
Date Of Previous Biopsy (Optional): 09/11/2024
Mauc Instructions: By selecting yes to the question below the MAUC number will be added into the note.  This will be calculated automatically based on the diagnosis chosen, the size entered, the body zone selected (H,M,L) and the specific indications you chose. You will also have the option to override the Mohs AUC if you disagree with the automatically calculated number and this option is found in the Case Summary tab.
Transposition Flap Text: A transposition flap was delineated in the adjacent donor area.  The flap was incised, widely undermined, elevated, and carried over the primary defect, then sutured into place.  Key buried subcutaneous sutures were placed to close the donor area.  The flap was then secured in place with buried vertical mattress sutures and the epidermis was approximated with simple interrupted and running sutures.  The donor area was similarly closed.  All tissue redundancies were repaired in standard fashion.
A-T Advancement Flap Text: The defect edges were debeveled with a #15 scalpel blade.  Given the location of the defect, shape of the defect and the proximity to free margins an A-T advancement flap was deemed most appropriate.  Using a sterile surgical marker, an appropriate advancement flap was drawn incorporating the defect and placing the expected incisions within the relaxed skin tension lines where possible.    The area thus outlined was incised deep to adipose tissue with a #15 scalpel blade.  The skin margins were undermined to an appropriate distance in all directions utilizing iris scissors and carried over to close the primary defect.
Bcc Keratotic Histology Text: There were aggregates of basaloid cells demonstrating a pink keratotic pattern.
Burow's Graft Text: The tissue surrounding the operative site was extensively undermined.  A raised cone was removed from one end of the defect to serve as a full thickness graft. This area was then closed by complex layered closure using buried vertical mattress sutures to close the superficial fascia and subcutaneous fat, and simple interrupted and running sutures to approximate the epidermis.  The obtained graft was defatted and placed in saline gauze.  The graft was trimmed to fit the defect as needed with blunt scissors.  The graft was then sutured into the recipient site with interrupted and basting sutures.  Careful attention was given to even approximation of the wound edges.
No Residual Tumor Seen Histology Text: There were no malignant cells seen in the sections examined.
Nasolabial Transposition Flap Text: The defect edges were debeveled with a #15 scalpel blade.  Given the size, depth and location of the defect and the proximity to free margins a nasolabial transposition flap was deemed most appropriate. Using a sterile surgical marker, an appropriate flap was drawn incorporating the defect. The area thus outlined was incised with a #15 scalpel blade. The skin margins were undermined to an appropriate distance in all directions utilizing iris scissors. Following this, the designed flap was carried into the primary defect and sutured into place.
Flip-Flop Flap Text: The defect edges were debeveled with a #15 blade scalpel.  Given the location of the defect and the proximity to free margins a flip-flop flap was deemed most appropriate. Using a sterile surgical marker, the appropriate flap was drawn incorporating the defect and placing the expected incisions between the helical rim and antihelix where possible.  The area thus outlined was incised through and through with a #15 scalpel blade. Following this, the designed flap was carried over into the primary defect and sutured into place.
Localized Dermabrasion With Sand Papertext: The patient was draped in routine manner.  Localized dermabrasion using sterile sand paper was performed in routine manner to papillary dermis. This spot dermabrasion is being performed to complete skin cancer reconstruction. It also will eliminate the other sun damaged precancerous cells that are known to be part of the regional effect of a lifetime's worth of sun exposure. This localized dermabrasion is therapeutic and should not be considered cosmetic in any regard.
Bill 59 Modifier?: No - Continue to Bill 79 Modifier
Rectangular Flap Text: The defect edges were debeveled with a #15 scalpel blade. Given the location of the defect and the proximity to free margins a rectangular flap was deemed most appropriate. Using a sterile surgical marker, an appropriate rectangular flap was drawn incorporating the defect. The area thus outlined was incised deep to adipose tissue with a #15 scalpel blade. The skin margins were undermined to an appropriate distance in all directions utilizing iris scissors. Following this, the designed flap was carried over into the primary defect and sutured into place.
Nasalis Myocutaneous Flap Text: Using a #15 blade, an incision was made around the donor flap to the level of the nasalis muscle. Wide lateral undermining was then performed in both the subcutaneous plane above the nasalis muscle, and in a submuscular plane just above periosteum. This allowed the formation of a free nasalis muscle axial pedicle which was still attached to the actual cutaneous flap, increasing its mobility and vascular viability. Hemostasis was obtained with pinpoint electrocoagulation. The flap was mobilized into position and the pivotal anchor points positioned and stabilized with buried interrupted sutures. Subcutaneous and dermal tissues were closed in a multilayered fashion with sutures. Tissue redundancies were excised, and the epidermal edges were apposed without significant tension and sutured with sutures.
Localized Dermabrasion With 15 Blade Text: The patient was draped in routine manner.  Localized dermabrasion using a 15 blade was performed in routine manner to papillary dermis. This spot dermabrasion is being performed to complete skin cancer reconstruction. It also will eliminate the other sun damaged precancerous cells that are known to be part of the regional effect of a lifetime's worth of sun exposure. This localized dermabrasion is therapeutic and should not be considered cosmetic in any regard.
Which Instrument Did You Use For Dermabrasion?: Wire Brush

## 2024-12-13 NOTE — PROCEDURE: UNNA BOOT APPLICATION
Location Applied: the right leg
Additional Instructions: Return in two weeks for Unna boot change or remove at home in two weeks
Was A Previous Unna Boot Removed?: No
After Unna Boot Application Text: Coban was used to wrap the outside of Unna Boot and we ensured the Unna Boot wasn't too tight prior to the patient leaving the office.
Removal Of Previous Unna Boot (Yes) Text: The previous Unna Boot was removed and then the site was cleaned in preparation for another Unna Boot application.
Indication: post-operative immobilization
Detail Level: Zone
Medication Occluded Under Unnaboot: Aquaphor
Kerlix Units: 1
Removal Of Previous Unna Boot (No) Text: The site was cleaned in preparation for an Unna Boot application.

## 2024-12-13 NOTE — PROCEDURE: MIPS QUALITY
Quality 47: Advance Care Plan: Advance Care Planning discussed and documented; advance care plan or surrogate decision maker documented in the medical record.
Name And Contact Information For Health Care Proxy: Nida Herrera -wife
Quality 226: Preventive Care And Screening: Tobacco Use: Screening And Cessation Intervention: Patient screened for tobacco use and is an ex/non-smoker
Quality 358: Patient-Centered Surgical Risk Assessment And Communication: Documentation of patient-specific risk assessment with a risk calculator based on multi-institutional clinical data, the specific risk calculator used, and communication of risk assessment from risk calculator with the patient or family.
Detail Level: Detailed

## 2024-12-26 ENCOUNTER — APPOINTMENT (OUTPATIENT)
Dept: URBAN - METROPOLITAN AREA SURGERY 20 | Age: 69
Setting detail: DERMATOLOGY
End: 2024-12-26

## 2024-12-26 DIAGNOSIS — Z48.02 ENCOUNTER FOR REMOVAL OF SUTURES: ICD-10-CM

## 2024-12-26 PROCEDURE — OTHER SUTURE REMOVAL (GLOBAL PERIOD): OTHER

## 2024-12-26 PROCEDURE — OTHER MIPS QUALITY: OTHER

## 2024-12-26 ASSESSMENT — LOCATION ZONE DERM: LOCATION ZONE: LEG

## 2024-12-26 ASSESSMENT — LOCATION DETAILED DESCRIPTION DERM: LOCATION DETAILED: RIGHT DISTAL CALF

## 2024-12-26 ASSESSMENT — LOCATION SIMPLE DESCRIPTION DERM: LOCATION SIMPLE: RIGHT CALF

## 2024-12-26 NOTE — PROCEDURE: SUTURE REMOVAL (GLOBAL PERIOD)
Detail Level: Detailed
Add 67581 Cpt? (Important Note: In 2017 The Use Of 68611 Is Being Tracked By Cms To Determine Future Global Period Reimbursement For Global Periods): no